# Patient Record
Sex: FEMALE | NOT HISPANIC OR LATINO
[De-identification: names, ages, dates, MRNs, and addresses within clinical notes are randomized per-mention and may not be internally consistent; named-entity substitution may affect disease eponyms.]

---

## 2019-04-03 PROBLEM — Z00.00 ENCOUNTER FOR PREVENTIVE HEALTH EXAMINATION: Status: ACTIVE | Noted: 2019-04-03

## 2019-04-11 ENCOUNTER — APPOINTMENT (OUTPATIENT)
Dept: SURGERY | Facility: CLINIC | Age: 54
End: 2019-04-11
Payer: MEDICAID

## 2019-04-11 VITALS
TEMPERATURE: 97.8 F | HEIGHT: 65 IN | WEIGHT: 78.99 LBS | RESPIRATION RATE: 15 BRPM | DIASTOLIC BLOOD PRESSURE: 81 MMHG | OXYGEN SATURATION: 98 % | BODY MASS INDEX: 13.16 KG/M2 | SYSTOLIC BLOOD PRESSURE: 116 MMHG | HEART RATE: 77 BPM

## 2019-04-11 DIAGNOSIS — Z82.49 FAMILY HISTORY OF ISCHEMIC HEART DISEASE AND OTHER DISEASES OF THE CIRCULATORY SYSTEM: ICD-10-CM

## 2019-04-11 DIAGNOSIS — M25.50 PAIN IN UNSPECIFIED JOINT: ICD-10-CM

## 2019-04-11 DIAGNOSIS — I10 ESSENTIAL (PRIMARY) HYPERTENSION: ICD-10-CM

## 2019-04-11 DIAGNOSIS — E03.9 HYPOTHYROIDISM, UNSPECIFIED: ICD-10-CM

## 2019-04-11 PROCEDURE — 46600 DIAGNOSTIC ANOSCOPY SPX: CPT

## 2019-04-11 PROCEDURE — 99203 OFFICE O/P NEW LOW 30 MIN: CPT | Mod: 25

## 2019-04-11 RX ORDER — LEVOTHYROXINE SODIUM 0.03 MG/1
25 TABLET ORAL
Refills: 0 | Status: ACTIVE | COMMUNITY

## 2019-04-11 RX ORDER — AMLODIPINE BESYLATE 5 MG/1
5 TABLET ORAL
Refills: 0 | Status: ACTIVE | COMMUNITY

## 2019-04-11 RX ORDER — ROSUVASTATIN CALCIUM 10 MG/1
10 TABLET, FILM COATED ORAL
Refills: 0 | Status: ACTIVE | COMMUNITY

## 2019-04-11 NOTE — CONSULT LETTER
[Dear  ___] : Dear ~BEATRIZ, [Consult Letter:] : I had the pleasure of evaluating your patient, [unfilled]. [Please see my note below.] : Please see my note below. [Consult Closing:] : Thank you very much for allowing me to participate in the care of this patient.  If you have any questions, please do not hesitate to contact me. [Sincerely,] : Sincerely, [FreeTextEntry2] : Dr. Tello Cisneros [FreeTextEntry3] : Nabor Bell M.D., ANGELINE.TREY., F.SHAWN.S.JEREMÍASRLazaroS.\[facility] Chief Colorectal Clinical Services, [facility]

## 2019-04-11 NOTE — HISTORY OF PRESENT ILLNESS
[FreeTextEntry1] : Cassandra is a 53 y/o female here for evaluation of rectal pain and bleeding. Reports daily rectal pain and bleeding for the past 8 months. Has BRB into the bowl, on toilet tissue and on underwear. Reports prolapsing anal tissue that does not reduce. Uses Senna leaf powder daily. Uses Dulcolax PRN. Reports 2 to 3 soft BM daily. PMH of constipation. Denies family history of colon cancer. Had RBL LL and RA in the past with no relief. Colonoscopy from 5/19/17 demonstrated hemorrhoids. Otherwise normal colonoscopy.

## 2019-04-11 NOTE — PHYSICAL EXAM
[Normal Breath Sounds] : Normal breath sounds [Normal Heart Sounds] : normal heart sounds [Normal Rate and Rhythm] : normal rate and rhythm [No Rash or Lesion] : No rash or lesion [Alert] : alert [Oriented to Person] : oriented to person [Oriented to Place] : oriented to place [Oriented to Time] : oriented to time [Calm] : calm [Abdomen Masses] : No abdominal masses [Abdomen Tenderness] : ~T No ~M abdominal tenderness [JVD] : no jugular venous distention  [de-identified] : soft, nondistended. +BS [de-identified] : Well nourished female, in no apparent distress [de-identified] : WNL [de-identified] : Full ROM [FreeTextEntry1] : Perianal inspection demonstrated prolapse of the right anterior hemorrhoid. The hemorrhoid was reducible on digital exam. Both digital exam and anoscopy confirmed large internal hemorrhoids. Squamous metaplasia, consistent with intermittent prolapse was identified.

## 2019-04-11 NOTE — ASSESSMENT
[FreeTextEntry1] : I have seen and evaluated patient, and I corroborated all nursing input into this note. Patient with grade 3 hemorrhoids. She had rubber band ligations without any change in symptoms. I discussed the options of additional ligations and surgery.  The patient chose surgery. Indications, risks, benefits, alternatives reviewed including but not limited to bleeding, infection, tags, pain, incontinence, stenosis, fistula, and fissure were reviewed. The patient's daughter was present for the conversation and all questions were answered.

## 2019-04-11 NOTE — REVIEW OF SYSTEMS
[As Noted in HPI] : as noted in HPI [Negative] : Endocrine [Feeling Tired] : feeling tired [Lower Ext Edema] : lower extremity edema [Constipation] : constipation [Heartburn] : heartburn [Joint Pain] : joint pain [Easy Bruising] : a tendency for easy bruising [FreeTextEntry5] : R ankle

## 2019-04-16 ENCOUNTER — OUTPATIENT (OUTPATIENT)
Dept: OUTPATIENT SERVICES | Facility: HOSPITAL | Age: 54
LOS: 1 days | End: 2019-04-16
Payer: MEDICAID

## 2019-04-16 VITALS
HEART RATE: 86 BPM | WEIGHT: 188.05 LBS | OXYGEN SATURATION: 99 % | SYSTOLIC BLOOD PRESSURE: 131 MMHG | HEIGHT: 65 IN | DIASTOLIC BLOOD PRESSURE: 85 MMHG | RESPIRATION RATE: 15 BRPM | TEMPERATURE: 99 F

## 2019-04-16 DIAGNOSIS — Z98.890 OTHER SPECIFIED POSTPROCEDURAL STATES: Chronic | ICD-10-CM

## 2019-04-16 DIAGNOSIS — E03.9 HYPOTHYROIDISM, UNSPECIFIED: ICD-10-CM

## 2019-04-16 DIAGNOSIS — K64.9 UNSPECIFIED HEMORRHOIDS: ICD-10-CM

## 2019-04-16 DIAGNOSIS — K64.2 THIRD DEGREE HEMORRHOIDS: ICD-10-CM

## 2019-04-16 DIAGNOSIS — Z01.818 ENCOUNTER FOR OTHER PREPROCEDURAL EXAMINATION: ICD-10-CM

## 2019-04-16 DIAGNOSIS — I10 ESSENTIAL (PRIMARY) HYPERTENSION: ICD-10-CM

## 2019-04-16 DIAGNOSIS — K62.89 OTHER SPECIFIED DISEASES OF ANUS AND RECTUM: ICD-10-CM

## 2019-04-16 DIAGNOSIS — K62.5 HEMORRHAGE OF ANUS AND RECTUM: ICD-10-CM

## 2019-04-16 LAB
ANION GAP SERPL CALC-SCNC: 16 MMOL/L — SIGNIFICANT CHANGE UP (ref 5–17)
BUN SERPL-MCNC: 19 MG/DL — SIGNIFICANT CHANGE UP (ref 7–23)
CALCIUM SERPL-MCNC: 9.5 MG/DL — SIGNIFICANT CHANGE UP (ref 8.4–10.5)
CHLORIDE SERPL-SCNC: 103 MMOL/L — SIGNIFICANT CHANGE UP (ref 96–108)
CO2 SERPL-SCNC: 23 MMOL/L — SIGNIFICANT CHANGE UP (ref 22–31)
CREAT SERPL-MCNC: 0.93 MG/DL — SIGNIFICANT CHANGE UP (ref 0.5–1.3)
GLUCOSE SERPL-MCNC: 103 MG/DL — HIGH (ref 70–99)
HCT VFR BLD CALC: 37.4 % — SIGNIFICANT CHANGE UP (ref 34.5–45)
HGB BLD-MCNC: 11.2 G/DL — LOW (ref 11.5–15.5)
MCHC RBC-ENTMCNC: 24.1 PG — LOW (ref 27–34)
MCHC RBC-ENTMCNC: 29.9 GM/DL — LOW (ref 32–36)
MCV RBC AUTO: 80.6 FL — SIGNIFICANT CHANGE UP (ref 80–100)
PLATELET # BLD AUTO: 336 K/UL — SIGNIFICANT CHANGE UP (ref 150–400)
POTASSIUM SERPL-MCNC: 3.8 MMOL/L — SIGNIFICANT CHANGE UP (ref 3.5–5.3)
POTASSIUM SERPL-SCNC: 3.8 MMOL/L — SIGNIFICANT CHANGE UP (ref 3.5–5.3)
RBC # BLD: 4.64 M/UL — SIGNIFICANT CHANGE UP (ref 3.8–5.2)
RBC # FLD: 14.6 % — HIGH (ref 10.3–14.5)
SODIUM SERPL-SCNC: 142 MMOL/L — SIGNIFICANT CHANGE UP (ref 135–145)
WBC # BLD: 6.53 K/UL — SIGNIFICANT CHANGE UP (ref 3.8–10.5)
WBC # FLD AUTO: 6.53 K/UL — SIGNIFICANT CHANGE UP (ref 3.8–10.5)

## 2019-04-16 PROCEDURE — 85027 COMPLETE CBC AUTOMATED: CPT

## 2019-04-16 PROCEDURE — 80048 BASIC METABOLIC PNL TOTAL CA: CPT

## 2019-04-16 PROCEDURE — G0463: CPT

## 2019-04-16 RX ORDER — LIDOCAINE HCL 20 MG/ML
0.2 VIAL (ML) INJECTION ONCE
Qty: 0 | Refills: 0 | Status: DISCONTINUED | OUTPATIENT
Start: 2019-04-19 | End: 2019-05-04

## 2019-04-16 RX ORDER — ACETAMINOPHEN 500 MG
1000 TABLET ORAL ONCE
Qty: 0 | Refills: 0 | Status: COMPLETED | OUTPATIENT
Start: 2019-04-19 | End: 2019-04-19

## 2019-04-16 RX ORDER — CELECOXIB 200 MG/1
200 CAPSULE ORAL ONCE
Qty: 0 | Refills: 0 | Status: COMPLETED | OUTPATIENT
Start: 2019-04-19 | End: 2019-04-19

## 2019-04-16 RX ORDER — SODIUM CHLORIDE 9 MG/ML
3 INJECTION INTRAMUSCULAR; INTRAVENOUS; SUBCUTANEOUS EVERY 8 HOURS
Qty: 0 | Refills: 0 | Status: DISCONTINUED | OUTPATIENT
Start: 2019-04-19 | End: 2019-05-04

## 2019-04-16 NOTE — H&P PST ADULT - NSICDXPASTMEDICALHX_GEN_ALL_CORE_FT
PAST MEDICAL HISTORY:  Hemorrhoids     HLD (hyperlipidemia)     HTN (hypertension) PAST MEDICAL HISTORY:  Constipation     GERD (gastroesophageal reflux disease)     Hemorrhoids     HLD (hyperlipidemia)     HTN (hypertension)     Hypothyroidism

## 2019-04-16 NOTE — H&P PST ADULT - NSICDXPROBLEM_GEN_ALL_CORE_FT
PROBLEM DIAGNOSES  Problem: Hemorrhoids  Assessment and Plan: Scheduled hemorrhoidectomy 4/19/2019  Pre-op instructions given to pt, lab work sent at PST  Pt was instructed by Dr. Bell to stop ASA 10 days pre-op, last dose 4/8/19    Problem: Hypothyroidism  Assessment and Plan: Instructed pt to take Levothyroxine in am of sx     Problem: HTN (hypertension)  Assessment and Plan: Instructed pt to take BP meds in am of sx

## 2019-04-16 NOTE — H&P PST ADULT - NSANTHOSAYNRD_GEN_A_CORE
No. HENRY screening performed.  STOP BANG Legend: 0-2 = LOW Risk; 3-4 = INTERMEDIATE Risk; 5-8 = HIGH Risk

## 2019-04-16 NOTE — H&P PST ADULT - HISTORY OF PRESENT ILLNESS
53 y/o female with PMHx of HTN, HLD, GERD, hypothyroidism, constipation and hemorrhoids- s/p colonoscopy with banding (2017), c/o rectal pain and bleeding with BMs for the past 8 months. Patient reports prolapsing anal tissue that does not reduce. Patient uses Senna leaf powder daily and Dulcolax PRN- reports 2-3 soft BMs daily. 55 y/o female with PMHx of HTN, HLD, GERD, hypothyroidism, constipation and hemorrhoids- s/p colonoscopy with banding (2017), c/o rectal pain and bleeding with BMs for the past 8 months. Patient reports prolapsing anal tissue that does not reduce. Patient uses Senna leaf powder daily and Dulcolax PRN- reports 2-3 soft BMs daily. Evaluated by Dr. Bell. Presents to [facility] for a scheduled hemorrhoidectomy on 4/19/2019. 55 y/o female with PMHx of HTN, HLD, GERD, hypothyroidism, constipation and hemorrhoids- s/p colonoscopy with banding (2017), c/o rectal pain and bleeding with BMs for the past 8 months. Patient reports prolapsing anal tissue that does not reduce. Patient uses Senna leaf powder daily and Dulcolax PRN- reports 2-3 soft BMs daily. Evaluated by Dr. Bell. Presents to [facility] for a scheduled hemorrhoidectomy on 4/19/2019. Failure of banding for grade 3 hemorrhoids

## 2019-04-16 NOTE — H&P PST ADULT - NSICDXPASTSURGICALHX_GEN_ALL_CORE_FT
PAST SURGICAL HISTORY:  H/O colonoscopy with banding (2017)    History of lumbar surgery 2001 PAST SURGICAL HISTORY:  H/O colonoscopy with rubber band ligation (2017)    History of lumbar surgery L4-5 (2001)

## 2019-04-18 ENCOUNTER — TRANSCRIPTION ENCOUNTER (OUTPATIENT)
Age: 54
End: 2019-04-18

## 2019-04-19 ENCOUNTER — OUTPATIENT (OUTPATIENT)
Dept: OUTPATIENT SERVICES | Facility: HOSPITAL | Age: 54
LOS: 1 days | End: 2019-04-19
Payer: MEDICAID

## 2019-04-19 ENCOUNTER — APPOINTMENT (OUTPATIENT)
Dept: SURGERY | Facility: HOSPITAL | Age: 54
End: 2019-04-19
Payer: MEDICAID

## 2019-04-19 ENCOUNTER — RESULT REVIEW (OUTPATIENT)
Age: 54
End: 2019-04-19

## 2019-04-19 VITALS
DIASTOLIC BLOOD PRESSURE: 66 MMHG | SYSTOLIC BLOOD PRESSURE: 104 MMHG | OXYGEN SATURATION: 100 % | RESPIRATION RATE: 14 BRPM | HEART RATE: 84 BPM

## 2019-04-19 VITALS
HEIGHT: 65 IN | OXYGEN SATURATION: 100 % | WEIGHT: 188.05 LBS | HEART RATE: 90 BPM | TEMPERATURE: 98 F | DIASTOLIC BLOOD PRESSURE: 79 MMHG | RESPIRATION RATE: 18 BRPM | SYSTOLIC BLOOD PRESSURE: 133 MMHG

## 2019-04-19 DIAGNOSIS — K62.5 HEMORRHAGE OF ANUS AND RECTUM: ICD-10-CM

## 2019-04-19 DIAGNOSIS — K62.89 OTHER SPECIFIED DISEASES OF ANUS AND RECTUM: ICD-10-CM

## 2019-04-19 DIAGNOSIS — Z98.890 OTHER SPECIFIED POSTPROCEDURAL STATES: Chronic | ICD-10-CM

## 2019-04-19 DIAGNOSIS — Z01.818 ENCOUNTER FOR OTHER PREPROCEDURAL EXAMINATION: ICD-10-CM

## 2019-04-19 DIAGNOSIS — K64.2 THIRD DEGREE HEMORRHOIDS: ICD-10-CM

## 2019-04-19 PROCEDURE — C1889: CPT

## 2019-04-19 PROCEDURE — 45505 REPAIR OF RECTUM: CPT | Mod: 59

## 2019-04-19 PROCEDURE — 46260 REMOVE IN/EX HEM GROUPS 2+: CPT

## 2019-04-19 PROCEDURE — 46260 REMOVE IN/EX HEM GROUPS 2+: CPT | Mod: 52

## 2019-04-19 RX ORDER — ONDANSETRON 8 MG/1
4 TABLET, FILM COATED ORAL ONCE
Qty: 0 | Refills: 0 | Status: COMPLETED | OUTPATIENT
Start: 2019-04-19 | End: 2019-04-19

## 2019-04-19 RX ORDER — LEVOTHYROXINE SODIUM 125 MCG
1 TABLET ORAL
Qty: 0 | Refills: 0 | COMMUNITY

## 2019-04-19 RX ORDER — CELECOXIB 200 MG/1
200 CAPSULE ORAL ONCE
Qty: 0 | Refills: 0 | Status: DISCONTINUED | OUTPATIENT
Start: 2019-04-19 | End: 2019-05-04

## 2019-04-19 RX ORDER — OXYCODONE HYDROCHLORIDE 5 MG/1
5 TABLET ORAL ONCE
Qty: 0 | Refills: 0 | Status: DISCONTINUED | OUTPATIENT
Start: 2019-04-19 | End: 2019-04-19

## 2019-04-19 RX ORDER — AMLODIPINE BESYLATE 2.5 MG/1
1 TABLET ORAL
Qty: 0 | Refills: 0 | COMMUNITY

## 2019-04-19 RX ORDER — ASPIRIN/CALCIUM CARB/MAGNESIUM 324 MG
1 TABLET ORAL
Qty: 0 | Refills: 0 | COMMUNITY

## 2019-04-19 RX ORDER — OMEPRAZOLE 10 MG/1
1 CAPSULE, DELAYED RELEASE ORAL
Qty: 0 | Refills: 0 | COMMUNITY

## 2019-04-19 RX ORDER — ROSUVASTATIN CALCIUM 5 MG/1
1 TABLET ORAL
Qty: 0 | Refills: 0 | COMMUNITY

## 2019-04-19 RX ADMIN — ONDANSETRON 4 MILLIGRAM(S): 8 TABLET, FILM COATED ORAL at 09:36

## 2019-04-19 RX ADMIN — CELECOXIB 200 MILLIGRAM(S): 200 CAPSULE ORAL at 07:17

## 2019-04-19 RX ADMIN — Medication 1000 MILLIGRAM(S): at 07:17

## 2019-04-19 NOTE — ASU DISCHARGE PLAN (ADULT/PEDIATRIC) - ASU DC SPECIAL INSTRUCTIONSFT
Please see handout for care, medication, and followup, instructions.    Pain medications has been sent to your pharmacy. You may take tylenol 1000mg every 6 hours and Advil 600mg every 6 hours. You may take the oxycodone (sent to your pharmacy) for breakthrough pain.

## 2019-04-19 NOTE — ASU DISCHARGE PLAN (ADULT/PEDIATRIC) - CARE PROVIDER_API CALL
Nabor Bell)  ColonRectal Surgery; Surgery  [address], [address]  [place] [address]  Phone: [phone]  Fax: [phone]  Follow Up Time:

## 2019-04-19 NOTE — ASU DISCHARGE PLAN (ADULT/PEDIATRIC) - ACTIVITY LEVEL
please see handout please see handout/No weight bearing/No sports/gym/Nothing per vagina/No heavy lifting/Nothing per rectum

## 2019-04-19 NOTE — BRIEF OPERATIVE NOTE - OPERATION/FINDINGS
Two hemorrhoids identified (left posterior and anterior) and excised. These excisions were sutured closed with minimal blood loss. Bulging areas of mucosal tissue (x2) were identified along the anal canal. Mucosa was identified and tied off. Minimal blood loss.

## 2019-04-19 NOTE — BRIEF OPERATIVE NOTE - SPECIMENS
skin lag, left posterior hemorrhoid, anterior hemorrhoid skin tag, left posterior hemorrhoid, anterior hemorrhoid

## 2019-04-19 NOTE — ASU PATIENT PROFILE, ADULT - PMH
Constipation    GERD (gastroesophageal reflux disease)    Hemorrhoids    HLD (hyperlipidemia)    HTN (hypertension)    Hypothyroidism

## 2019-04-19 NOTE — ASU DISCHARGE PLAN (ADULT/PEDIATRIC) - PAIN MANAGEMENT
Prescriptions electronically submitted to pharmacy from doctor's office Prescription called to pharmacy/Prescriptions electronically submitted to pharmacy from doctor's office

## 2019-04-19 NOTE — BRIEF OPERATIVE NOTE - NSICDXBRIEFPROCEDURE_GEN_ALL_CORE_FT
PROCEDURES:  Simple hemorrhoidectomy, internal and external hemorrhoids 19-Apr-2019 09:34:41  Josh Dupont  Mucosal proctoplasty 19-Apr-2019 09:32:40  Josh Dupont

## 2019-04-19 NOTE — ASU DISCHARGE PLAN (ADULT/PEDIATRIC) - CALL YOUR DOCTOR IF YOU HAVE ANY OF THE FOLLOWING:
Bleeding that does not stop/Pain not relieved by Medications/Nausea and vomiting that does not stop/Swelling that gets worse/Fever greater than (need to indicate Fahrenheit or Celsius)

## 2019-04-22 PROBLEM — K59.00 CONSTIPATION, UNSPECIFIED: Chronic | Status: ACTIVE | Noted: 2019-04-16

## 2019-04-22 PROBLEM — E78.5 HYPERLIPIDEMIA, UNSPECIFIED: Chronic | Status: ACTIVE | Noted: 2019-04-16

## 2019-04-22 PROBLEM — K21.9 GASTRO-ESOPHAGEAL REFLUX DISEASE WITHOUT ESOPHAGITIS: Chronic | Status: ACTIVE | Noted: 2019-04-16

## 2019-04-22 PROBLEM — K64.9 UNSPECIFIED HEMORRHOIDS: Chronic | Status: ACTIVE | Noted: 2019-04-16

## 2019-04-22 PROBLEM — I10 ESSENTIAL (PRIMARY) HYPERTENSION: Chronic | Status: ACTIVE | Noted: 2019-04-16

## 2019-04-22 PROBLEM — E03.9 HYPOTHYROIDISM, UNSPECIFIED: Chronic | Status: ACTIVE | Noted: 2019-04-16

## 2019-04-29 LAB — SURGICAL PATHOLOGY STUDY: SIGNIFICANT CHANGE UP

## 2019-05-09 ENCOUNTER — APPOINTMENT (OUTPATIENT)
Dept: SURGERY | Facility: CLINIC | Age: 54
End: 2019-05-09
Payer: MEDICAID

## 2019-05-09 VITALS
HEART RATE: 95 BPM | SYSTOLIC BLOOD PRESSURE: 115 MMHG | RESPIRATION RATE: 16 BRPM | OXYGEN SATURATION: 98 % | TEMPERATURE: 98.7 F | DIASTOLIC BLOOD PRESSURE: 77 MMHG

## 2019-05-09 DIAGNOSIS — K62.89 OTHER SPECIFIED DISEASES OF ANUS AND RECTUM: ICD-10-CM

## 2019-05-09 DIAGNOSIS — K64.2 THIRD DEGREE HEMORRHOIDS: ICD-10-CM

## 2019-05-09 DIAGNOSIS — Z87.19 PERSONAL HISTORY OF OTHER DISEASES OF THE DIGESTIVE SYSTEM: ICD-10-CM

## 2019-05-09 PROCEDURE — 99024 POSTOP FOLLOW-UP VISIT: CPT

## 2019-05-09 RX ORDER — ASPIRIN 81 MG
81 TABLET, DELAYED RELEASE (ENTERIC COATED) ORAL
Refills: 0 | Status: DISCONTINUED | COMMUNITY
End: 2019-05-09

## 2019-05-09 RX ORDER — OXYCODONE 5 MG/1
5 TABLET ORAL
Qty: 18 | Refills: 0 | Status: DISCONTINUED | COMMUNITY
Start: 2019-04-19 | End: 2019-05-09

## 2019-05-09 NOTE — HISTORY OF PRESENT ILLNESS
[FreeTextEntry1] : Cassandra is a 53 y/o female here for post-operative visit. 4/19/19- Hemorrhoidectomy. Mucosal proctoplasty. Pathology: Fibroepithelial polyp/hemorrhoid x3. \par Patient reports feeling well. Denies pain. Denies BRBPR. Denies swelling. \par Having 1 normal formed BM daily. Taking metamucil daily. \par Still doing sitz baths and using Metrogel after BMs.

## 2019-06-27 ENCOUNTER — APPOINTMENT (OUTPATIENT)
Dept: SURGERY | Facility: CLINIC | Age: 54
End: 2019-06-27
Payer: MEDICAID

## 2019-06-27 VITALS
DIASTOLIC BLOOD PRESSURE: 74 MMHG | HEART RATE: 75 BPM | RESPIRATION RATE: 15 BRPM | OXYGEN SATURATION: 100 % | TEMPERATURE: 98.2 F | SYSTOLIC BLOOD PRESSURE: 108 MMHG

## 2019-06-27 DIAGNOSIS — Z09 ENCOUNTER FOR FOLLOW-UP EXAMINATION AFTER COMPLETED TREATMENT FOR CONDITIONS OTHER THAN MALIGNANT NEOPLASM: ICD-10-CM

## 2019-06-27 PROCEDURE — 46600 DIAGNOSTIC ANOSCOPY SPX: CPT | Mod: 58

## 2019-06-27 RX ORDER — METRONIDAZOLE 10 MG/G
1 GEL TOPICAL
Qty: 1 | Refills: 3 | Status: DISCONTINUED | COMMUNITY
Start: 2019-04-19 | End: 2019-06-27

## 2019-06-27 NOTE — HISTORY OF PRESENT ILLNESS
[FreeTextEntry1] : Cassandra is a 55 y/o female here for post-operative visit. 4/19/19- Hemorrhoidectomy. Mucosal proctoplasty. Pathology: Fibroepithelial polyp/hemorrhoid x3. Last seen on 5/9/19, patient doing well. Advised to continue Metamucil. Today, patient reports feeling well. Denies rectal pain or bleeding. Uses Metamucil daily. Has some occasions of hard stool.

## 2021-09-01 NOTE — H&P PST ADULT - CARDIOVASCULAR COMMENTS
H&P reviewed, patient examined. NO significant interval change in patient's condition.    Marito Cullen MD PGY-6  IR   Hx: HTN, HLD

## 2025-02-10 NOTE — H&P PST ADULT - IS PATIENT PREGNANT?
MTM referral from: [facility] visit (referral by provider)    MTM referral outreach attempt #1 on February 10, 2025 at 12:29 PM      Outcome: Spoke with patient this afternoon. She is seeing an MTM pharmacist through [facility]. Patient said the pharmacist's name is Gregory. Patient said she has been seeing him as MTM wasn't covered through her insurance plan at [place] previously. Patient is wondering if she should still meet with our MTM pharmacist in cardiology. Please advise. Patient can be reached  at [phone]. If appointment is still needed she is open to scheduling with our MTM pharmacist.     Use hbc for the carrier/Plan on the flowsheet      Angelika Smith CPhT  MTM        
no

## 2025-06-05 NOTE — H&P PST ADULT - HEIGHT IN INCHES
Anesthesia ROS/Med Hx    Overall Review:  Pts. EKG was reviewed     Cardiovascular Review:    Exercise tolerance: good  Pt. positive for hypertension  Pt. positive for hyperlipidemia    End/Other Review:    Pt. positive for obesity     Anesthesia Plan  ASA Status: 2  Anesthesia Type: General  Induction: Intravenous and Inhalational  Preferred Airway Type: ETT  Reviewed: Lab Results, EKG, Consultations, Pre-Induction Reassessment, Patient Summary, Beta Blocker Status, DNR Status, Past Med History, Nursing Notes, Problem List, Medications, Allergies and NPO Status  The proposed anesthetic plan, including its risks and benefits, have been discussed with the Patient - along with the risks and benefits of alternatives.  Questions were encouraged and answered and the patient and/or representative agrees to proceed.  Informed Consent for Blood: Consented      Physical Exam  Mallampati: II  TM Distance: >3 FB  Neck ROM: Full  Cardio Rhythm: Regular  Cardio Rate: Normal  pulmonary exam normal  abdominal exam normal  dental exam normal       no... 5